# Patient Record
Sex: MALE | HISPANIC OR LATINO | URBAN - METROPOLITAN AREA
[De-identification: names, ages, dates, MRNs, and addresses within clinical notes are randomized per-mention and may not be internally consistent; named-entity substitution may affect disease eponyms.]

---

## 2023-11-30 ENCOUNTER — EMERGENCY (EMERGENCY)
Facility: HOSPITAL | Age: 38
LOS: 1 days | Discharge: HOME | End: 2023-11-30
Attending: EMERGENCY MEDICINE
Payer: SELF-PAY

## 2023-11-30 VITALS
TEMPERATURE: 97 F | HEART RATE: 73 BPM | SYSTOLIC BLOOD PRESSURE: 137 MMHG | DIASTOLIC BLOOD PRESSURE: 70 MMHG | HEIGHT: 64.57 IN | OXYGEN SATURATION: 100 % | WEIGHT: 139.99 LBS | RESPIRATION RATE: 20 BRPM

## 2023-11-30 PROCEDURE — 71045 X-RAY EXAM CHEST 1 VIEW: CPT

## 2023-11-30 PROCEDURE — 72170 X-RAY EXAM OF PELVIS: CPT | Mod: 26

## 2023-11-30 PROCEDURE — 72170 X-RAY EXAM OF PELVIS: CPT

## 2023-11-30 PROCEDURE — 72125 CT NECK SPINE W/O DYE: CPT | Mod: MA

## 2023-11-30 PROCEDURE — 72125 CT NECK SPINE W/O DYE: CPT | Mod: 26,MA

## 2023-11-30 PROCEDURE — 70450 CT HEAD/BRAIN W/O DYE: CPT | Mod: 26,MA

## 2023-11-30 PROCEDURE — 70450 CT HEAD/BRAIN W/O DYE: CPT | Mod: MA

## 2023-11-30 PROCEDURE — 99284 EMERGENCY DEPT VISIT MOD MDM: CPT | Mod: 25

## 2023-11-30 PROCEDURE — T1013: CPT

## 2023-11-30 PROCEDURE — 71045 X-RAY EXAM CHEST 1 VIEW: CPT | Mod: 26

## 2023-11-30 PROCEDURE — 73030 X-RAY EXAM OF SHOULDER: CPT

## 2023-11-30 PROCEDURE — 99285 EMERGENCY DEPT VISIT HI MDM: CPT

## 2023-11-30 PROCEDURE — 73030 X-RAY EXAM OF SHOULDER: CPT | Mod: 26,LT

## 2023-11-30 RX ORDER — ACETAMINOPHEN 500 MG
975 TABLET ORAL ONCE
Refills: 0 | Status: COMPLETED | OUTPATIENT
Start: 2023-11-30 | End: 2023-11-30

## 2023-11-30 RX ORDER — METHOCARBAMOL 500 MG/1
1500 TABLET, FILM COATED ORAL ONCE
Refills: 0 | Status: COMPLETED | OUTPATIENT
Start: 2023-11-30 | End: 2023-11-30

## 2023-11-30 RX ADMIN — Medication 975 MILLIGRAM(S): at 23:00

## 2023-11-30 RX ADMIN — METHOCARBAMOL 1500 MILLIGRAM(S): 500 TABLET, FILM COATED ORAL at 23:00
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
## 2023-11-30 NOTE — ED PROVIDER NOTE - CLINICAL SUMMARY MEDICAL DECISION MAKING FREE TEXT BOX
No evidence of chest, abdominal, back injury. Patient with full ROM of extremities and exam low suspicion for extremity fractures, and x-rays ___________. Character low suspicion for ICH or vertebral fracture, and CTs ____________. Given Tylenol and Robaxin with No evidence of chest, abdominal, back injury. Patient with full ROM of extremities and exam low suspicion for extremity fractures, and x-rays negative. Character low suspicion for ICH or vertebral fracture, and CTs pending. On reassessment even without meds pt with significant improvement and moving all extrems without effort. Given Tylenol and Robaxin. NAD, well appearing. Signed off care to Dr. GIO Cabello who will f/u CT's and reassess.
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
## 2023-11-30 NOTE — ED PROVIDER NOTE - PROGRESS NOTE DETAILS
NAD, well appearing. Signed off care to Dr. GIO Cabello who will f/u CT's and reassess. CTH/Prasanna wnl. Will d/c
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
## 2023-11-30 NOTE — ED PROVIDER NOTE - PHYSICAL EXAMINATION
Afebrile, hemodynamically stable, saturating well on room air  NAD, nontoxic appearing, laying back in bed, no WOB/tachypnea, speaking full sentences  Head NCAT, generalized neck tenderness  No TL spine TTP/stepoff/deformity  PERRL, EOMI, anicteric  MMM  No JVD  RRR, nml S1/S2, no m/r/g, no ant/lat/posterior rib TTP or bruising  Lungs CTAB, no w/r/r  Abd soft, NT, ND, nml BS, no rebound or guarding  AAO, CN's 3-12 grossly intact  Pain with b/l SLR with no stepoff/deformity, no leg cyanosis or edema  Skin warm, well perfused, no rashes or hives

## 2023-11-30 NOTE — ED PROVIDER NOTE - PATIENT PORTAL LINK FT
You can access the FollowMyHealth Patient Portal offered by [facility] by registering at the following website: [email or web address]. By joining [employer]’s FollowMyHealth portal, you will also be able to view your health information using other applications (apps) compatible with our system.

## 2023-12-01 VITALS
DIASTOLIC BLOOD PRESSURE: 76 MMHG | TEMPERATURE: 97 F | OXYGEN SATURATION: 98 % | SYSTOLIC BLOOD PRESSURE: 110 MMHG | RESPIRATION RATE: 20 BRPM | HEART RATE: 63 BPM

## 2023-12-01 RX ORDER — CYCLOBENZAPRINE HYDROCHLORIDE 10 MG/1
1 TABLET, FILM COATED ORAL
Qty: 15 | Refills: 0
Start: 2023-12-01 | End: 2023-12-05